# Patient Record
Sex: MALE | Race: OTHER | NOT HISPANIC OR LATINO | URBAN - METROPOLITAN AREA
[De-identification: names, ages, dates, MRNs, and addresses within clinical notes are randomized per-mention and may not be internally consistent; named-entity substitution may affect disease eponyms.]

---

## 2017-07-28 ENCOUNTER — EMERGENCY (EMERGENCY)
Facility: HOSPITAL | Age: 32
LOS: 1 days | Discharge: HOME | End: 2017-07-28
Attending: EMERGENCY MEDICINE | Admitting: EMERGENCY MEDICINE
Payer: SELF-PAY

## 2017-07-28 VITALS
HEART RATE: 95 BPM | RESPIRATION RATE: 16 BRPM | TEMPERATURE: 210 F | SYSTOLIC BLOOD PRESSURE: 133 MMHG | OXYGEN SATURATION: 98 % | DIASTOLIC BLOOD PRESSURE: 90 MMHG

## 2017-07-28 PROCEDURE — 99285 EMERGENCY DEPT VISIT HI MDM: CPT | Mod: 25

## 2017-07-28 PROCEDURE — 99053 MED SERV 10PM-8AM 24 HR FAC: CPT

## 2017-07-28 NOTE — ED ADULT TRIAGE NOTE - CHIEF COMPLAINT QUOTE
Pt st." For 2 weeks having rectal pain ...pain got worse...saw PMD few days ago...MD said not sure if hemerroids....pain has increased within the rectum....and running fevers. " Denies hx. Pt appears uncomfortable.

## 2017-07-29 VITALS
RESPIRATION RATE: 16 BRPM | OXYGEN SATURATION: 100 % | SYSTOLIC BLOOD PRESSURE: 137 MMHG | HEART RATE: 82 BPM | DIASTOLIC BLOOD PRESSURE: 79 MMHG | TEMPERATURE: 99 F

## 2017-07-29 LAB
ALBUMIN SERPL ELPH-MCNC: 4.3 G/DL — SIGNIFICANT CHANGE UP (ref 3.3–5)
ALP SERPL-CCNC: 71 U/L — SIGNIFICANT CHANGE UP (ref 40–120)
ALT FLD-CCNC: 29 U/L — SIGNIFICANT CHANGE UP (ref 4–41)
APTT BLD: 35.9 SEC — SIGNIFICANT CHANGE UP (ref 27.5–37.4)
AST SERPL-CCNC: 19 U/L — SIGNIFICANT CHANGE UP (ref 4–40)
BASOPHILS # BLD AUTO: 0.03 K/UL — SIGNIFICANT CHANGE UP (ref 0–0.2)
BASOPHILS NFR BLD AUTO: 0.3 % — SIGNIFICANT CHANGE UP (ref 0–2)
BILIRUB SERPL-MCNC: 0.3 MG/DL — SIGNIFICANT CHANGE UP (ref 0.2–1.2)
BLD GP AB SCN SERPL QL: NEGATIVE — SIGNIFICANT CHANGE UP
BUN SERPL-MCNC: 12 MG/DL — SIGNIFICANT CHANGE UP (ref 7–23)
CALCIUM SERPL-MCNC: 9.5 MG/DL — SIGNIFICANT CHANGE UP (ref 8.4–10.5)
CHLORIDE SERPL-SCNC: 102 MMOL/L — SIGNIFICANT CHANGE UP (ref 98–107)
CO2 SERPL-SCNC: 27 MMOL/L — SIGNIFICANT CHANGE UP (ref 22–31)
CREAT SERPL-MCNC: 1.08 MG/DL — SIGNIFICANT CHANGE UP (ref 0.5–1.3)
EOSINOPHIL # BLD AUTO: 0.05 K/UL — SIGNIFICANT CHANGE UP (ref 0–0.5)
EOSINOPHIL NFR BLD AUTO: 0.4 % — SIGNIFICANT CHANGE UP (ref 0–6)
GLUCOSE SERPL-MCNC: 85 MG/DL — SIGNIFICANT CHANGE UP (ref 70–99)
GRAM STN SPEC: SIGNIFICANT CHANGE UP
HCT VFR BLD CALC: 43.2 % — SIGNIFICANT CHANGE UP (ref 39–50)
HGB BLD-MCNC: 12.9 G/DL — LOW (ref 13–17)
IMM GRANULOCYTES # BLD AUTO: 0.04 # — SIGNIFICANT CHANGE UP
IMM GRANULOCYTES NFR BLD AUTO: 0.3 % — SIGNIFICANT CHANGE UP (ref 0–1.5)
INR BLD: 1.05 — SIGNIFICANT CHANGE UP (ref 0.88–1.17)
LYMPHOCYTES # BLD AUTO: 2.59 K/UL — SIGNIFICANT CHANGE UP (ref 1–3.3)
LYMPHOCYTES # BLD AUTO: 21.9 % — SIGNIFICANT CHANGE UP (ref 13–44)
MCHC RBC-ENTMCNC: 18.7 PG — LOW (ref 27–34)
MCHC RBC-ENTMCNC: 29.9 % — LOW (ref 32–36)
MCV RBC AUTO: 62.5 FL — LOW (ref 80–100)
MONOCYTES # BLD AUTO: 0.9 K/UL — SIGNIFICANT CHANGE UP (ref 0–0.9)
MONOCYTES NFR BLD AUTO: 7.6 % — SIGNIFICANT CHANGE UP (ref 2–14)
NEUTROPHILS # BLD AUTO: 8.2 K/UL — HIGH (ref 1.8–7.4)
NEUTROPHILS NFR BLD AUTO: 69.5 % — SIGNIFICANT CHANGE UP (ref 43–77)
NRBC # FLD: 0 — SIGNIFICANT CHANGE UP
PLATELET # BLD AUTO: 257 K/UL — SIGNIFICANT CHANGE UP (ref 150–400)
PMV BLD: 10.2 FL — SIGNIFICANT CHANGE UP (ref 7–13)
POTASSIUM SERPL-MCNC: 3.9 MMOL/L — SIGNIFICANT CHANGE UP (ref 3.5–5.3)
POTASSIUM SERPL-SCNC: 3.9 MMOL/L — SIGNIFICANT CHANGE UP (ref 3.5–5.3)
PROT SERPL-MCNC: 7.5 G/DL — SIGNIFICANT CHANGE UP (ref 6–8.3)
PROTHROM AB SERPL-ACNC: 11.8 SEC — SIGNIFICANT CHANGE UP (ref 9.8–13.1)
RBC # BLD: 6.91 M/UL — HIGH (ref 4.2–5.8)
RBC # FLD: 18.1 % — HIGH (ref 10.3–14.5)
RH IG SCN BLD-IMP: POSITIVE — SIGNIFICANT CHANGE UP
SODIUM SERPL-SCNC: 141 MMOL/L — SIGNIFICANT CHANGE UP (ref 135–145)
SPECIMEN SOURCE: SIGNIFICANT CHANGE UP
WBC # BLD: 11.81 K/UL — HIGH (ref 3.8–10.5)
WBC # FLD AUTO: 11.81 K/UL — HIGH (ref 3.8–10.5)

## 2017-07-29 PROCEDURE — 72193 CT PELVIS W/DYE: CPT | Mod: 26

## 2017-07-29 RX ORDER — SODIUM CHLORIDE 9 MG/ML
1000 INJECTION INTRAMUSCULAR; INTRAVENOUS; SUBCUTANEOUS ONCE
Qty: 0 | Refills: 0 | Status: COMPLETED | OUTPATIENT
Start: 2017-07-29 | End: 2017-07-29

## 2017-07-29 RX ORDER — OXYCODONE AND ACETAMINOPHEN 5; 325 MG/1; MG/1
1 TABLET ORAL ONCE
Qty: 0 | Refills: 0 | Status: DISCONTINUED | OUTPATIENT
Start: 2017-07-29 | End: 2017-07-29

## 2017-07-29 RX ORDER — MORPHINE SULFATE 50 MG/1
4 CAPSULE, EXTENDED RELEASE ORAL ONCE
Qty: 0 | Refills: 0 | Status: DISCONTINUED | OUTPATIENT
Start: 2017-07-29 | End: 2017-07-29

## 2017-07-29 RX ORDER — PIPERACILLIN AND TAZOBACTAM 4; .5 G/20ML; G/20ML
3.38 INJECTION, POWDER, LYOPHILIZED, FOR SOLUTION INTRAVENOUS ONCE
Qty: 0 | Refills: 0 | Status: COMPLETED | OUTPATIENT
Start: 2017-07-29 | End: 2017-07-29

## 2017-07-29 RX ADMIN — MORPHINE SULFATE 4 MILLIGRAM(S): 50 CAPSULE, EXTENDED RELEASE ORAL at 02:31

## 2017-07-29 RX ADMIN — MORPHINE SULFATE 4 MILLIGRAM(S): 50 CAPSULE, EXTENDED RELEASE ORAL at 02:46

## 2017-07-29 RX ADMIN — OXYCODONE AND ACETAMINOPHEN 1 TABLET(S): 5; 325 TABLET ORAL at 07:00

## 2017-07-29 RX ADMIN — OXYCODONE AND ACETAMINOPHEN 1 TABLET(S): 5; 325 TABLET ORAL at 07:15

## 2017-07-29 RX ADMIN — SODIUM CHLORIDE 1000 MILLILITER(S): 9 INJECTION INTRAMUSCULAR; INTRAVENOUS; SUBCUTANEOUS at 02:31

## 2017-07-29 RX ADMIN — PIPERACILLIN AND TAZOBACTAM 200 GRAM(S): 4; .5 INJECTION, POWDER, LYOPHILIZED, FOR SOLUTION INTRAVENOUS at 02:31

## 2017-07-29 NOTE — ED PROVIDER NOTE - MEDICAL DECISION MAKING DETAILS
32M p/w rectal and buttock mass concerning for perirectal abscess. Rectal temp, zosyn, labs, morphine and CT pelvis IV contrast

## 2017-07-29 NOTE — ED PROVIDER NOTE - OBJECTIVE STATEMENT
33yo male pmh herniated discs p/w perirectal pain x 2w. No improvement with flagyl and ceftin x several days. Subj fevers/chills last week. Denies chest pain, dyspnea, n/v/d, urinary symptoms. No abd surgeries. No immunosuppressive disorders.

## 2017-07-29 NOTE — ED PROVIDER NOTE - RECTAL
TENDER/induration on right posterior rectum. Indurated mass right medial buttock, no fluctuance. Chaperone PCA Alden

## 2017-07-29 NOTE — ED PROVIDER NOTE - DIAGNOSIS COUNSELING, MDM
conducted a detailed discussion... I had a detailed discussion with the patient  regarding the historical points, exam findings, and any diagnostic results supporting the discharge/admit diagnosis.

## 2017-07-29 NOTE — ED PROVIDER NOTE - PROGRESS NOTE DETAILS
1.3cm perianal abscess. Surgery consulted Surgery drained abscess. Will continue antibiotics. Will prescribe percocet and give surgery follow up. Pt agrees with plan

## 2017-07-29 NOTE — ED ADULT NURSE NOTE - OBJECTIVE STATEMENT
Pt A+OX3.  States he has a perianal abscess that began 2 weeks ago and isn't getting better and is causing him pain 10/10.  States it's difficult to sit or have a BM.  Labs obtained and sent as ordered.  #20g SL R arm placed.  Kept NPO.  VSS.  Meds given as ordered.  IVF begun.  Pt refusing rectal temp or exam stating he really doesn't feel comfortable with these things.  MD notified.  Pt to CT

## 2017-07-29 NOTE — ED PROVIDER NOTE - ATTENDING CONTRIBUTION TO CARE
DR. MERA, ATTENDING MD-  I performed a face to face bedside interview with patient regarding history of present illness, review of symptoms and past medical history. I completed an independent physical exam.  I have discussed patient's plan of care with the resident.   Documentation as above in the note.   HPI: 31 yo M with no Past Medical History that presents with rectal pain x 2 wks without bleeding. No trauma, no penetration. + Fevers, chills, constipation due to pain with defecation. Never had this before. Went to PMD, worry for hemorrhoids, on Abx but not helping.   EXAM: Fluctuance anal fold, no gross blood.   MDM: Worry for abscess. Will obtain labs, CT with IV contrast and provide IVabx and pain meds. Most likely needs surgery.

## 2017-07-30 NOTE — CONSULT NOTE ADULT - SUBJECTIVE AND OBJECTIVE BOX
31yo previously healthy male presented with perirectal pain of two weeks' duration, diagnosed with a perirectal abscess by his primary care physician and treated with ceftin for last 5 days with worsening pain and subjective fevers over past two days.  Denies headaches, chest pain, shortness of breath, abdominal pain, dysuria, joint/muscle pain.  No past medical or surgical history.  No home medications  Allergic to aspirin    37.2 82 137/79 16 100% RA  NAD A+Ox2  Non-labored breathing  RRR  Abd soft, nontender, nondistended  Palpable mass at 3 o'clock, exquisitely tender, 2 cm from anal verge. No palpable mass in anal canal, no palpable defect, no purulent discharge, normal sphincter tone.     WBC=12 platelets=257 INR=1.05, PTT=36    CT Abdomen and pelvis: There is a 1.3 x 0.8 x 1.2 cm wall enhancing fluid   collection in the right gluteal fold, indistinguishable from the anus.   There is no evidence of wall thickening of the rectum above the perineum.   Small fat-containing umbilical hernia.     32 male with perirectal abscess.   Patient consented to bedside incision and drainage after discussion of risks, benefits, and alternatives.  Patient was positioned in right decubitus. Mass palpated, skin prepped with chlorhexidine stick, 20mL of 1% lidocaine with epinephrine injected, needle inserted into mass, 4cc of pus aspirated and sent for culture. One centimeter incision made along trajectory of needle with expression of pus. Ten more mL of local anesthetic was injected and abscess cavity gently explored with tonsil clamp with no septae encountered. Cavity packed with corner of gauze. Spoke to patient and to his wife about importance daily packing changes and after defecation, and keeping area as clean/dry as possible. They expressed understanding and readiness to do said dressing changes. Patient instructed to follow up in colorectal clinic in 1 to 2 weeks unless symptoms do not improve/worsen.    D/w attending.    Jd, PGY 3

## 2017-07-31 LAB
-  AMIKACIN: SIGNIFICANT CHANGE UP
-  AMPICILLIN/SULBACTAM: SIGNIFICANT CHANGE UP
-  AMPICILLIN: SIGNIFICANT CHANGE UP
-  AZTREONAM: SIGNIFICANT CHANGE UP
-  CEFAZOLIN: SIGNIFICANT CHANGE UP
-  CEFEPIME: SIGNIFICANT CHANGE UP
-  CEFOXITIN: SIGNIFICANT CHANGE UP
-  CEFTAZIDIME: SIGNIFICANT CHANGE UP
-  CEFTRIAXONE: SIGNIFICANT CHANGE UP
-  CIPROFLOXACIN: SIGNIFICANT CHANGE UP
-  ERTAPENEM: SIGNIFICANT CHANGE UP
-  GENTAMICIN: SIGNIFICANT CHANGE UP
-  IMIPENEM: SIGNIFICANT CHANGE UP
-  LEVOFLOXACIN: SIGNIFICANT CHANGE UP
-  MEROPENEM: SIGNIFICANT CHANGE UP
-  PIPERACILLIN/TAZOBACTAM: SIGNIFICANT CHANGE UP
-  TIGECYCLINE: SIGNIFICANT CHANGE UP
-  TOBRAMYCIN: SIGNIFICANT CHANGE UP
-  TRIMETHOPRIM/SULFAMETHOXAZOLE: SIGNIFICANT CHANGE UP
CULTURE RESULTS: SIGNIFICANT CHANGE UP
GRAM STN SPEC: SIGNIFICANT CHANGE UP
METHOD TYPE: SIGNIFICANT CHANGE UP
ORGANISM # SPEC MICROSCOPIC CNT: SIGNIFICANT CHANGE UP
ORGANISM # SPEC MICROSCOPIC CNT: SIGNIFICANT CHANGE UP

## 2017-09-19 ENCOUNTER — EMERGENCY (EMERGENCY)
Facility: HOSPITAL | Age: 32
LOS: 1 days | Discharge: HOME | End: 2017-09-19
Admitting: SURGERY
Payer: MEDICAID

## 2017-09-19 VITALS
TEMPERATURE: 98 F | HEART RATE: 104 BPM | OXYGEN SATURATION: 98 % | SYSTOLIC BLOOD PRESSURE: 130 MMHG | RESPIRATION RATE: 20 BRPM | DIASTOLIC BLOOD PRESSURE: 76 MMHG

## 2017-09-19 VITALS
HEART RATE: 66 BPM | DIASTOLIC BLOOD PRESSURE: 76 MMHG | RESPIRATION RATE: 18 BRPM | TEMPERATURE: 97 F | SYSTOLIC BLOOD PRESSURE: 109 MMHG | OXYGEN SATURATION: 98 %

## 2017-09-19 DIAGNOSIS — K61.1 RECTAL ABSCESS: ICD-10-CM

## 2017-09-19 LAB
ALBUMIN SERPL ELPH-MCNC: 4.2 G/DL — SIGNIFICANT CHANGE UP (ref 3.3–5)
ALP SERPL-CCNC: 56 U/L — SIGNIFICANT CHANGE UP (ref 40–120)
ALT FLD-CCNC: 42 U/L — HIGH (ref 4–41)
APTT BLD: 34.5 SEC — SIGNIFICANT CHANGE UP (ref 27.5–37.4)
AST SERPL-CCNC: 24 U/L — SIGNIFICANT CHANGE UP (ref 4–40)
BASOPHILS # BLD AUTO: 0.03 K/UL — SIGNIFICANT CHANGE UP (ref 0–0.2)
BASOPHILS NFR BLD AUTO: 0.4 % — SIGNIFICANT CHANGE UP (ref 0–2)
BILIRUB SERPL-MCNC: 0.2 MG/DL — SIGNIFICANT CHANGE UP (ref 0.2–1.2)
BLD GP AB SCN SERPL QL: NEGATIVE — SIGNIFICANT CHANGE UP
BUN SERPL-MCNC: 14 MG/DL — SIGNIFICANT CHANGE UP (ref 7–23)
CALCIUM SERPL-MCNC: 9.1 MG/DL — SIGNIFICANT CHANGE UP (ref 8.4–10.5)
CHLORIDE SERPL-SCNC: 103 MMOL/L — SIGNIFICANT CHANGE UP (ref 98–107)
CO2 SERPL-SCNC: 25 MMOL/L — SIGNIFICANT CHANGE UP (ref 22–31)
CREAT SERPL-MCNC: 1.24 MG/DL — SIGNIFICANT CHANGE UP (ref 0.5–1.3)
EOSINOPHIL # BLD AUTO: 0.09 K/UL — SIGNIFICANT CHANGE UP (ref 0–0.5)
EOSINOPHIL NFR BLD AUTO: 1.3 % — SIGNIFICANT CHANGE UP (ref 0–6)
GLUCOSE SERPL-MCNC: 101 MG/DL — HIGH (ref 70–99)
HCT VFR BLD CALC: 40.6 % — SIGNIFICANT CHANGE UP (ref 39–50)
HGB BLD-MCNC: 12.6 G/DL — LOW (ref 13–17)
IMM GRANULOCYTES # BLD AUTO: 0.01 # — SIGNIFICANT CHANGE UP
IMM GRANULOCYTES NFR BLD AUTO: 0.1 % — SIGNIFICANT CHANGE UP (ref 0–1.5)
INR BLD: 1.01 — SIGNIFICANT CHANGE UP (ref 0.88–1.17)
LYMPHOCYTES # BLD AUTO: 3.29 K/UL — SIGNIFICANT CHANGE UP (ref 1–3.3)
LYMPHOCYTES # BLD AUTO: 49 % — HIGH (ref 13–44)
MCHC RBC-ENTMCNC: 19.4 PG — LOW (ref 27–34)
MCHC RBC-ENTMCNC: 31 % — LOW (ref 32–36)
MCV RBC AUTO: 62.4 FL — LOW (ref 80–100)
MONOCYTES # BLD AUTO: 0.54 K/UL — SIGNIFICANT CHANGE UP (ref 0–0.9)
MONOCYTES NFR BLD AUTO: 8 % — SIGNIFICANT CHANGE UP (ref 2–14)
NEUTROPHILS # BLD AUTO: 2.75 K/UL — SIGNIFICANT CHANGE UP (ref 1.8–7.4)
NEUTROPHILS NFR BLD AUTO: 41.2 % — LOW (ref 43–77)
NRBC # FLD: 0.02 — SIGNIFICANT CHANGE UP
PLATELET # BLD AUTO: 255 K/UL — SIGNIFICANT CHANGE UP (ref 150–400)
PMV BLD: 9.6 FL — SIGNIFICANT CHANGE UP (ref 7–13)
POTASSIUM SERPL-MCNC: 4.2 MMOL/L — SIGNIFICANT CHANGE UP (ref 3.5–5.3)
POTASSIUM SERPL-SCNC: 4.2 MMOL/L — SIGNIFICANT CHANGE UP (ref 3.5–5.3)
PROT SERPL-MCNC: 6.9 G/DL — SIGNIFICANT CHANGE UP (ref 6–8.3)
PROTHROM AB SERPL-ACNC: 11.3 SEC — SIGNIFICANT CHANGE UP (ref 9.8–13.1)
RBC # BLD: 6.51 M/UL — HIGH (ref 4.2–5.8)
RBC # FLD: 18.6 % — HIGH (ref 10.3–14.5)
RH IG SCN BLD-IMP: POSITIVE — SIGNIFICANT CHANGE UP
RH IG SCN BLD-IMP: POSITIVE — SIGNIFICANT CHANGE UP
SODIUM SERPL-SCNC: 140 MMOL/L — SIGNIFICANT CHANGE UP (ref 135–145)
WBC # BLD: 6.71 K/UL — SIGNIFICANT CHANGE UP (ref 3.8–10.5)
WBC # FLD AUTO: 6.71 K/UL — SIGNIFICANT CHANGE UP (ref 3.8–10.5)

## 2017-09-19 PROCEDURE — 99284 EMERGENCY DEPT VISIT MOD MDM: CPT | Mod: 25

## 2017-09-19 RX ORDER — SENNA PLUS 8.6 MG/1
1 TABLET ORAL DAILY
Qty: 0 | Refills: 0 | Status: DISCONTINUED | OUTPATIENT
Start: 2017-09-19 | End: 2017-09-19

## 2017-09-19 RX ORDER — IBUPROFEN 200 MG
400 TABLET ORAL DAILY
Qty: 0 | Refills: 0 | Status: DISCONTINUED | OUTPATIENT
Start: 2017-09-19 | End: 2017-09-19

## 2017-09-19 RX ORDER — DOCUSATE SODIUM 100 MG
100 CAPSULE ORAL
Qty: 0 | Refills: 0 | Status: DISCONTINUED | OUTPATIENT
Start: 2017-09-19 | End: 2017-09-19

## 2017-09-19 RX ORDER — SODIUM CHLORIDE 9 MG/ML
1000 INJECTION, SOLUTION INTRAVENOUS
Qty: 0 | Refills: 0 | Status: DISCONTINUED | OUTPATIENT
Start: 2017-09-19 | End: 2017-09-19

## 2017-09-19 RX ORDER — ACETAMINOPHEN 500 MG
650 TABLET ORAL EVERY 6 HOURS
Qty: 0 | Refills: 0 | Status: DISCONTINUED | OUTPATIENT
Start: 2017-09-19 | End: 2017-09-19

## 2017-09-19 RX ORDER — MORPHINE SULFATE 50 MG/1
4 CAPSULE, EXTENDED RELEASE ORAL ONCE
Qty: 0 | Refills: 0 | Status: DISCONTINUED | OUTPATIENT
Start: 2017-09-19 | End: 2017-09-19

## 2017-09-19 RX ORDER — ENOXAPARIN SODIUM 100 MG/ML
40 INJECTION SUBCUTANEOUS DAILY
Qty: 0 | Refills: 0 | Status: DISCONTINUED | OUTPATIENT
Start: 2017-09-19 | End: 2017-09-19

## 2017-09-19 RX ORDER — MORPHINE SULFATE 50 MG/1
4 CAPSULE, EXTENDED RELEASE ORAL
Qty: 0 | Refills: 0 | Status: DISCONTINUED | OUTPATIENT
Start: 2017-09-19 | End: 2017-09-19

## 2017-09-19 RX ORDER — DOCUSATE SODIUM 100 MG
1 CAPSULE ORAL
Qty: 0 | Refills: 0 | COMMUNITY
Start: 2017-09-19

## 2017-09-19 RX ORDER — SODIUM CHLORIDE 9 MG/ML
1000 INJECTION INTRAMUSCULAR; INTRAVENOUS; SUBCUTANEOUS ONCE
Qty: 0 | Refills: 0 | Status: COMPLETED | OUTPATIENT
Start: 2017-09-19 | End: 2017-09-19

## 2017-09-19 RX ORDER — ACETAMINOPHEN 500 MG
1000 TABLET ORAL ONCE
Qty: 0 | Refills: 0 | Status: COMPLETED | OUTPATIENT
Start: 2017-09-19 | End: 2017-09-19

## 2017-09-19 RX ORDER — SENNA PLUS 8.6 MG/1
1 TABLET ORAL
Qty: 0 | Refills: 0 | COMMUNITY
Start: 2017-09-19

## 2017-09-19 RX ORDER — IBUPROFEN 200 MG
1 TABLET ORAL
Qty: 0 | Refills: 0 | COMMUNITY
Start: 2017-09-19

## 2017-09-19 RX ORDER — KETOROLAC TROMETHAMINE 30 MG/ML
30 SYRINGE (ML) INJECTION EVERY 8 HOURS
Qty: 0 | Refills: 0 | Status: DISCONTINUED | OUTPATIENT
Start: 2017-09-19 | End: 2017-09-19

## 2017-09-19 RX ORDER — PIPERACILLIN AND TAZOBACTAM 4; .5 G/20ML; G/20ML
3.38 INJECTION, POWDER, LYOPHILIZED, FOR SOLUTION INTRAVENOUS ONCE
Qty: 0 | Refills: 0 | Status: COMPLETED | OUTPATIENT
Start: 2017-09-19 | End: 2017-09-19

## 2017-09-19 RX ADMIN — MORPHINE SULFATE 4 MILLIGRAM(S): 50 CAPSULE, EXTENDED RELEASE ORAL at 06:40

## 2017-09-19 RX ADMIN — PIPERACILLIN AND TAZOBACTAM 200 GRAM(S): 4; .5 INJECTION, POWDER, LYOPHILIZED, FOR SOLUTION INTRAVENOUS at 07:35

## 2017-09-19 RX ADMIN — ENOXAPARIN SODIUM 40 MILLIGRAM(S): 100 INJECTION SUBCUTANEOUS at 13:27

## 2017-09-19 RX ADMIN — Medication 400 MILLIGRAM(S): at 06:40

## 2017-09-19 RX ADMIN — SODIUM CHLORIDE 125 MILLILITER(S): 9 INJECTION, SOLUTION INTRAVENOUS at 06:40

## 2017-09-19 RX ADMIN — Medication 650 MILLIGRAM(S): at 13:27

## 2017-09-19 RX ADMIN — Medication 650 MILLIGRAM(S): at 19:11

## 2017-09-19 RX ADMIN — Medication 1 TABLET(S): at 19:11

## 2017-09-19 RX ADMIN — Medication 650 MILLIGRAM(S): at 14:21

## 2017-09-19 RX ADMIN — SENNA PLUS 1 TABLET(S): 8.6 TABLET ORAL at 13:27

## 2017-09-19 RX ADMIN — Medication 100 MILLIGRAM(S): at 19:11

## 2017-09-19 RX ADMIN — Medication 650 MILLIGRAM(S): at 20:22

## 2017-09-19 RX ADMIN — SODIUM CHLORIDE 1000 MILLILITER(S): 9 INJECTION INTRAMUSCULAR; INTRAVENOUS; SUBCUTANEOUS at 04:45

## 2017-09-19 RX ADMIN — MORPHINE SULFATE 4 MILLIGRAM(S): 50 CAPSULE, EXTENDED RELEASE ORAL at 04:45

## 2017-09-19 RX ADMIN — Medication 1000 MILLIGRAM(S): at 07:15

## 2017-09-19 NOTE — H&P ADULT - PROBLEM SELECTOR PLAN 1
-Cipro/Flagyl  nothing by mouth  LR @ 100 Zosyn 3.375g IV Q8   nothing by mouth  LR @ 100 Zosyn 3.375g IV Q8   nothing by mouth  LR @ 100  -CT pelvis to assess current status of inflammation/infection.

## 2017-09-19 NOTE — ED ADULT TRIAGE NOTE - CHIEF COMPLAINT QUOTE
Pt. has abscess near rectal area. He had it drained in August, 2017, but it is not healing and still draining pus. c/o pain.

## 2017-09-19 NOTE — DISCHARGE NOTE ADULT - MEDICATION SUMMARY - MEDICATIONS TO TAKE
I will START or STAY ON the medications listed below when I get home from the hospital:    Motrin 400 mg oral tablet  -- 1 tab(s) by mouth every 8 hours. Maximum 4 tablets.  Take as needed for pain.  -- Indication: For Pain    docusate sodium 100 mg oral capsule  -- 1 cap(s) by mouth 3 times a day, as needed for constipation  -- Indication: For Constipation    senna oral tablet  -- 1 tab(s) by mouth once a day  -- Indication: For Constipation    Augmentin 875 mg-125 mg oral tablet  -- 1 tab(s) by mouth 2 times a day   -- Finish all this medication unless otherwise directed by prescriber.  Take with food or milk.    -- Indication: For Perirectal abscess

## 2017-09-19 NOTE — DISCHARGE NOTE ADULT - ADDITIONAL INSTRUCTIONS
Please follow up with Dr. Echeverria within 1-2 weeks after discharge from the hospital. You may call [phone] to schedule an appointment.

## 2017-09-19 NOTE — ED PROVIDER NOTE - NS_EDPROVIDERDISPOUSERTYPE_ED_A_ED
I have personally evaluated and examined the patient. The Attending was available to me as a supervising provider if needed. Attending Attestation (For Attendings USE Only).../I have personally evaluated and examined the patient. The Attending was available to me as a supervising provider if needed.

## 2017-09-19 NOTE — DISCHARGE NOTE ADULT - CARE PROVIDER_API CALL
Duncan Echeverria (MD), ColonRectal Surgery; Surgery  [facility]  [address] [place] [id number]  Phone: [phone]  Fax: [phone]

## 2017-09-19 NOTE — DISCHARGE NOTE ADULT - CARE PLAN
Principal Discharge DX:	Perirectal abscess  Goal:	Return of GI function and pain control  Instructions for follow-up, activity and diet:	take senna and colace as needed for constipation, and motrin as prescribed (as needed) for pain control.

## 2017-09-19 NOTE — H&P ADULT - HISTORY OF PRESENT ILLNESS
32M with 4th ED visit for Perianal abscess. Patient was I&D'd July 29, 2017 in ED by surgical house staff and sent home with cipro/flagyl to follow-up with RODRI Salazar. Patient did not. States he had relief with narcotics, however constipation and straining worsened his perianal burning and pain. Denies trauma. Initially patient refused to be admitted on this evaluation, stating he wanted this to be drained in the ED and he would follow up this time. Given the tenderness and inability to safely perform adequate incision, drainage, LUZ, he succumbed to admission and would be explored in the operating theatre.

## 2017-09-19 NOTE — DISCHARGE NOTE ADULT - PLAN OF CARE
Return of GI function and pain control take senna and colace as needed for constipation, and motrin as prescribed (as needed) for pain control.

## 2017-09-19 NOTE — DISCHARGE NOTE ADULT - HOSPITAL COURSE
Patient came to ED for perianal pain.  CT scan was performed and showed no abscess.  Patient is being discharged on same day on prophylactic antibiotics (Augmentin 875/125 BID for 1 week) and OTC pain medication as well as OTC medication for constipation.  Patient is instructed to follow up w/ Dr. Duncan Echeverria in 1 week.

## 2017-09-19 NOTE — ED ADULT NURSE REASSESSMENT NOTE - NS ED NURSE REASSESS COMMENT FT1
Patient to go to surgery this morning. Patient's family not here at this time, patient agreeable to send belonging to security. Belongings with valuables to be walked down by HIEU salcido. Carbon copy in chart.

## 2017-09-19 NOTE — H&P ADULT - NSHPLABSRESULTS_GEN_ALL_CORE
CBC Full  -  ( 19 Sep 2017 04:40 )  WBC Count : 6.71 K/uL  Hemoglobin : 12.6 g/dL  Hematocrit : 40.6 %  Platelet Count - Automated : 255 K/uL  Mean Cell Volume : 62.4 fL  Mean Cell Hemoglobin : 19.4 pg  Mean Cell Hemoglobin Concentration : 31.0 %  Auto Neutrophil # : 2.75 K/uL  Auto Lymphocyte # : 3.29 K/uL  Auto Monocyte # : 0.54 K/uL  Auto Eosinophil # : 0.09 K/uL  Auto Basophil # : 0.03 K/uL  Auto Neutrophil % : 41.2 %  Auto Lymphocyte % : 49.0 %  Auto Monocyte % : 8.0 %  Auto Eosinophil % : 1.3 %  Auto Basophil % : 0.4 %    09-19    140  |  103  |  14  ----------------------------<  101<H>  4.2   |  25  |  1.24    Ca    9.1      19 Sep 2017 04:40    TPro  6.9  /  Alb  4.2  /  TBili  0.2  /  DBili  x   /  AST  24  /  ALT  42<H>  /  AlkPhos  56  09-19

## 2017-09-19 NOTE — DISCHARGE NOTE ADULT - PATIENT PORTAL LINK FT
“You can access the [employer] Patient Portal, offered by [facility], by registering with the following website: [email or web address]”

## 2017-09-19 NOTE — ED PROVIDER NOTE - OBJECTIVE STATEMENT
33 y/o male no pmh, was seen in ER on july 29th after failed outatient abx therapy for perianal abscess, bedside I&D done, sent home, has not followed up with surgery now c/o 2 weeks of worsening idalia-rectal abscess, drainage, malodorous odor, pain worsening x last few days, ccan barely sit, denies any ehadaches, enck pain, f/c/n/v/d, chest pain, sob, abdominal pain, urinary symptoms, numbness/weaknesstingling, recent travel, sick contact, social history

## 2017-09-19 NOTE — H&P ADULT - NSHPPHYSICALEXAM_GEN_ALL_CORE
General: WN/WD NAD  Neurology: A&Ox3, nonfocal, CHILDERS x 4  Head:  Normocephalic, atraumatic  ENT:  Mucosa moist, no ulcerations  Neck:  Supple, no sinuses or palpable masses  Lymphatic:  No palpable cervical, supraclavicular, axillary or inguinal adenopathy  Respiratory: CTA B/L  CV: RRR, S1S2, no murmur  Abdominal: Soft, NT, ND no palpable mass  MSK: No edema, + peripheral pulses, FROM all 4 extremity  Incisions: intact, no erythema or drainage General: WN/WD NAD  Neurology: A&Ox3, nonfocal, CHILDERS x 4  Head:  Normocephalic, atraumatic  ENT:  Mucosa moist, no ulcerations  Neck:  Supple, no sinuses or palpable masses  Lymphatic:  No palpable cervical, supraclavicular, axillary or inguinal adenopathy  Respiratory: CTA B/L  CV: RRR,   Abdominal: Soft, NT, ND no palpable mass  : Right perianal tenderness and erythema, fullness of anus, no fluctuance, minimal induration 1cm incision with minimal drainage  MSK: No edema, + peripheral pulses, FROM all 4 extremity  Incisions: intact, no erythema or drainage

## 2017-09-19 NOTE — H&P ADULT - ATTENDING COMMENTS
I have reviewed history and physical examination, pertinent labs and imaging, and discussed the case with colleagues, residents, and physician assistants on B Team rounds.    The active care issues are:  1. recurrent perianal abscess concerning for [id number]    Antibiotics and colorectal surgery consultation for interrogation of fistula.

## 2017-09-19 NOTE — ED PROVIDER NOTE - PROGRESS NOTE DETAILS
HARLEY NEGRON: surgery drained the perirectal at bed side, at first patient refused O.R, bu pt now willingto stay and go to OR

## 2017-09-24 RX ORDER — IBUPROFEN 200 MG
1 TABLET ORAL
Qty: 0 | Refills: 0 | COMMUNITY

## 2019-09-29 NOTE — ED ADULT NURSE NOTE - PAIN RATING/NUMBER SCALE (0-10): ACTIVITY
10
Patient with known history of CHF. Last echo 8/12/2019 with EF 35-40%. AICD last interrogated on 8/12- no events, with plan to change battery on 10/4.  -continue home metoprolol  -patient s/p 1 L NS in ED and banana bag  - strict Is and Os in setting of CHF   - hx captopril allergy- no ACEI/ARB at this time  - daily weights.

## 2023-07-24 NOTE — ED PROVIDER NOTE - ABDOMINAL EXAM
Refill Decision Note   Hannah Norman  is requesting a refill authorization.  Brief Assessment and Rationale for Refill:  Quick Discontinue     Medication Therapy Plan:  Receipt confirmed by pharmacy (7/21/2023  5:32 PM CDT)      Comments:     Note composed:2:39 AM 07/24/2023            soft/nondistended/nontender...